# Patient Record
Sex: FEMALE | Race: OTHER | Employment: FULL TIME | URBAN - METROPOLITAN AREA
[De-identification: names, ages, dates, MRNs, and addresses within clinical notes are randomized per-mention and may not be internally consistent; named-entity substitution may affect disease eponyms.]

---

## 2024-11-07 ENCOUNTER — OFFICE VISIT (OUTPATIENT)
Dept: OBGYN CLINIC | Facility: CLINIC | Age: 34
End: 2024-11-07

## 2024-11-07 VITALS
HEIGHT: 64 IN | BODY MASS INDEX: 50.02 KG/M2 | SYSTOLIC BLOOD PRESSURE: 116 MMHG | HEART RATE: 79 BPM | DIASTOLIC BLOOD PRESSURE: 66 MMHG | WEIGHT: 293 LBS

## 2024-11-07 DIAGNOSIS — Z11.3 SCREENING EXAMINATION FOR STD (SEXUALLY TRANSMITTED DISEASE): ICD-10-CM

## 2024-11-07 DIAGNOSIS — Z80.3 FAMILY HISTORY OF BREAST CANCER: ICD-10-CM

## 2024-11-07 DIAGNOSIS — Z12.39 ENCOUNTER FOR BREAST CANCER SCREENING USING NON-MAMMOGRAM MODALITY: ICD-10-CM

## 2024-11-07 DIAGNOSIS — Z01.419 WOMEN'S ANNUAL ROUTINE GYNECOLOGICAL EXAMINATION: Primary | ICD-10-CM

## 2024-11-07 DIAGNOSIS — Z12.4 CERVICAL CANCER SCREENING: ICD-10-CM

## 2024-11-07 PROBLEM — Z84.2: Status: ACTIVE | Noted: 2024-11-07

## 2024-11-07 PROCEDURE — 88175 CYTOPATH C/V AUTO FLUID REDO: CPT | Performed by: NURSE PRACTITIONER

## 2024-11-07 PROCEDURE — S0610 ANNUAL GYNECOLOGICAL EXAMINA: HCPCS | Performed by: NURSE PRACTITIONER

## 2024-11-07 PROCEDURE — 87491 CHLMYD TRACH DNA AMP PROBE: CPT | Performed by: NURSE PRACTITIONER

## 2024-11-07 PROCEDURE — 87591 N.GONORRHOEAE DNA AMP PROB: CPT | Performed by: NURSE PRACTITIONER

## 2024-11-07 PROCEDURE — 87624 HPV HI-RISK TYP POOLED RSLT: CPT | Performed by: NURSE PRACTITIONER

## 2024-11-07 RX ORDER — LEVONORGESTREL 19.5 MG/1
1 INTRAUTERINE DEVICE INTRAUTERINE
COMMUNITY

## 2024-11-07 NOTE — PROGRESS NOTES
"ANNUAL GYNECOLOGICAL EXAMINATION    Julia Tijerina is a 33 y.o. female who presents today for annual GYN exam.  Her last pap smear was performed 2021 and result was NILM.  She reports no history of abnormal pap smears in her past.  She reports menses as absent with IUD.  No LMP recorded (lmp unknown). (Menstrual status: Birth Control).  Her general medical history has been reviewed and she reports it as follows:    Past Medical History:   Diagnosis Date    Asthma     Family history of abscess of breast 2024    Maternal 1st cousin dx with breast CA at age 42. Discussed early mammograms.        History reviewed. No pertinent surgical history.  OB History          3    Para   2    Term   2            AB   1    Living   2         SAB        IAB        Ectopic        Multiple        Live Births   2               Social History     Tobacco Use    Smoking status: Never    Smokeless tobacco: Never   Substance Use Topics    Alcohol use: Never    Drug use: Never     Social History     Substance and Sexual Activity   Sexual Activity Yes    Partners: Male    Birth control/protection: I.U.D.     Cancer-related family history includes Breast cancer (age of onset: 42) in her cousin.    Current Outpatient Medications   Medication Instructions    levonorgestrel (Kyleena) 19.5 MG intrauterine device 1 each, Intrauterine Device, Once every 5 years - IUD       Review of Systems:  Review of Systems   Constitutional: Negative.    Gastrointestinal: Negative.    Genitourinary: Negative.    Skin: Negative.        Physical Exam:  /66 (BP Location: Left arm, Patient Position: Sitting)   Pulse 79   Ht 5' 4\" (1.626 m)   Wt (!) 142 kg (312 lb)   LMP  (LMP Unknown)   BMI 53.55 kg/m²   Physical Exam  Constitutional:       General: She is not in acute distress.     Appearance: Normal appearance.   Genitourinary:      Vulva and bladder normal.      No lesions in the vagina.      No vaginal erythema or ulceration.      "   Right Adnexa: not tender and no mass present.     Left Adnexa: not tender and no mass present.     No cervical motion tenderness or lesion.      IUD strings visualized.      Uterus is not enlarged or tender.      No uterine mass detected.  Breasts:     Right: No mass, nipple discharge or skin change.      Left: No mass, nipple discharge or skin change.   Cardiovascular:      Rate and Rhythm: Normal rate and regular rhythm.   Pulmonary:      Effort: Pulmonary effort is normal.      Breath sounds: Normal breath sounds.   Abdominal:      General: Abdomen is flat.      Palpations: Abdomen is soft.   Musculoskeletal:      Cervical back: Neck supple.   Neurological:      Mental Status: She is alert.   Skin:     General: Skin is warm and dry.   Psychiatric:         Mood and Affect: Mood normal.         Behavior: Behavior normal.   Vitals reviewed.         Assessment/Plan:   1. Normal well-woman GYN exam.  2. Cervical cancer screening:  Normal cervical exam.  Pap smear done with HPV co-testing.  Has reports received HPV vaccine in the past.   3. STD screening: Orders placed for vaginal GC/CT cultures.  Orders placed for serum anti-HIV, anti-HCV, HbsAg, syphilis panel.   4. Breast cancer screening:  Normal breast exam. Reviewed breast self-awareness. Discussed early mammograms to start next year due to family history.    5. Depression Screening: Patient's depression screening was assessed with a PHQ-2 score of 0.  Clinically patient does not have depression. No treatment is required.     6. BMI Counseling: Body mass index is 53.55 kg/m². Discussed the patient's BMI with her. The BMI is above normal. Exercise recommendations include exercising 3-5 times per week.   7. Contraception:  Kyleena IUD, placed in 2021. She plans to return for removal of IUD, would like to try to conceive in the next few months.    8. Return to office for IUD removal.    Reviewed with patient that test results are available in [place] immediately,  but that they will not necessarily be reviewed by me immediately.  Explained that I will review results at my earliest opportunity and contact patient appropriately.

## 2024-11-08 LAB
HPV HR 12 DNA CVX QL NAA+PROBE: NEGATIVE
HPV16 DNA CVX QL NAA+PROBE: NEGATIVE
HPV18 DNA CVX QL NAA+PROBE: NEGATIVE

## 2024-11-10 LAB
C TRACH DNA SPEC QL NAA+PROBE: NEGATIVE
N GONORRHOEA DNA SPEC QL NAA+PROBE: NEGATIVE

## 2024-11-12 LAB
LAB AP GYN PRIMARY INTERPRETATION: NORMAL
Lab: NORMAL

## 2024-11-14 ENCOUNTER — RESULTS FOLLOW-UP (OUTPATIENT)
Dept: OBGYN CLINIC | Facility: CLINIC | Age: 34
End: 2024-11-14

## 2024-11-14 NOTE — TELEPHONE ENCOUNTER
Called pt and lvm w/ office number for callback.    ----- Message from GISEL Dickinson sent at 11/13/2024  7:54 AM EST -----  Please let her know the pap and STI screening is negative. Thank you!

## 2024-11-18 ENCOUNTER — PROCEDURE VISIT (OUTPATIENT)
Dept: OBGYN CLINIC | Facility: CLINIC | Age: 34
End: 2024-11-18

## 2024-11-18 VITALS
HEART RATE: 111 BPM | WEIGHT: 293 LBS | DIASTOLIC BLOOD PRESSURE: 97 MMHG | SYSTOLIC BLOOD PRESSURE: 144 MMHG | HEIGHT: 64 IN | BODY MASS INDEX: 50.02 KG/M2

## 2024-11-18 DIAGNOSIS — Z30.432 ENCOUNTER FOR IUD REMOVAL: Primary | ICD-10-CM

## 2024-11-18 PROCEDURE — 58301 REMOVE INTRAUTERINE DEVICE: CPT | Performed by: NURSE PRACTITIONER

## 2024-11-18 NOTE — PROGRESS NOTES
IUD Procedure    Date/Time: 11/18/2024 4:35 PM    Performed by: GISEL Dickinson  Authorized by: GISEL Dickinson    Other Assisting Provider: No    Verbal consent obtained?: Yes    Written consent obtained?: Yes    Risks and benefits: Risks, benefits and alternatives were discussed    Consent given by:  Patient  Time Out:     Time out: Immediately prior to the procedure a time out was called    Patient states understanding of procedure being performed: Yes    Patient's understanding of procedure matches consent: Yes    Procedure consent matches procedure scheduled: Yes    Relevant documents present and verified: Yes    Patient identity confirmed:  Verbally with patient  Select procedure: IUD removal    IUD Removal:     Reason for removal: patient request      Removed without complications: yes      Strings visualized: yes      IUD intact: yes    Removal Comments:      IUD removed with out issue. Patient plans to try to conceive in the near future. She will start daily PNV with folic acid and will call the office with positive upt.